# Patient Record
Sex: FEMALE | Race: WHITE
[De-identification: names, ages, dates, MRNs, and addresses within clinical notes are randomized per-mention and may not be internally consistent; named-entity substitution may affect disease eponyms.]

---

## 2017-02-12 ENCOUNTER — HOSPITAL ENCOUNTER (OUTPATIENT)
Dept: HOSPITAL 62 - LC | Age: 37
Discharge: HOME | End: 2017-02-12
Attending: OBSTETRICS & GYNECOLOGY
Payer: COMMERCIAL

## 2017-02-12 DIAGNOSIS — O47.02: Primary | ICD-10-CM

## 2017-02-12 DIAGNOSIS — O09.522: ICD-10-CM

## 2017-02-12 DIAGNOSIS — Z3A.17: ICD-10-CM

## 2017-02-12 LAB
APPEARANCE UR: CLEAR
BARBITURATES UR QL SCN: (no result)
BILIRUB UR QL STRIP: NEGATIVE
GLUCOSE UR STRIP-MCNC: NEGATIVE MG/DL
KETONES UR STRIP-MCNC: NEGATIVE MG/DL
METHADONE UR QL SCN: NEGATIVE
NITRITE UR QL STRIP: NEGATIVE
PCP UR QL SCN: NEGATIVE
PH UR STRIP: 6 [PH] (ref 5–9)
PROT UR STRIP-MCNC: NEGATIVE MG/DL
SP GR UR STRIP: 1
URINE OPIATES LOW: NEGATIVE
UROBILINOGEN UR-MCNC: NEGATIVE MG/DL (ref ?–2)

## 2017-02-12 PROCEDURE — 76815 OB US LIMITED FETUS(S): CPT

## 2017-02-12 PROCEDURE — 4A1HXCZ MONITORING OF PRODUCTS OF CONCEPTION, CARDIAC RATE, EXTERNAL APPROACH: ICD-10-PCS | Performed by: OBSTETRICS & GYNECOLOGY

## 2017-02-12 PROCEDURE — 80307 DRUG TEST PRSMV CHEM ANLYZR: CPT

## 2017-02-12 PROCEDURE — 81001 URINALYSIS AUTO W/SCOPE: CPT

## 2017-06-09 ENCOUNTER — HOSPITAL ENCOUNTER (OUTPATIENT)
Dept: HOSPITAL 62 - LC | Age: 37
Discharge: HOME | End: 2017-06-09
Attending: OBSTETRICS & GYNECOLOGY
Payer: COMMERCIAL

## 2017-06-09 DIAGNOSIS — O16.3: Primary | ICD-10-CM

## 2017-06-09 DIAGNOSIS — O09.523: ICD-10-CM

## 2017-06-09 DIAGNOSIS — Z3A.34: ICD-10-CM

## 2017-06-09 LAB
ALBUMIN SERPL-MCNC: 3.1 G/DL (ref 3.5–5)
ALP SERPL-CCNC: 94 U/L (ref 38–126)
ALT SERPL-CCNC: 23 U/L (ref 9–52)
ANION GAP SERPL CALC-SCNC: 8 MMOL/L (ref 5–19)
APPEARANCE UR: CLEAR
AST SERPL-CCNC: 19 U/L (ref 14–36)
BARBITURATES UR QL SCN: (no result)
BASOPHILS # BLD AUTO: 0.1 10^3/UL (ref 0–0.2)
BASOPHILS NFR BLD AUTO: 0.6 % (ref 0–2)
BILIRUB DIRECT SERPL-MCNC: 0.2 MG/DL (ref 0–0.4)
BILIRUB SERPL-MCNC: 0.3 MG/DL (ref 0.2–1.3)
BILIRUB UR QL STRIP: NEGATIVE
BUN SERPL-MCNC: 6 MG/DL (ref 7–20)
CALCIUM: 8.6 MG/DL (ref 8.4–10.2)
CHLORIDE SERPL-SCNC: 108 MMOL/L (ref 98–107)
CO2 SERPL-SCNC: 22 MMOL/L (ref 22–30)
CREAT SERPL-MCNC: 0.57 MG/DL (ref 0.52–1.25)
CREAT UR-MCNC: 17 MG/DL (ref 16–327)
EOSINOPHIL # BLD AUTO: 0.1 10^3/UL (ref 0–0.6)
EOSINOPHIL NFR BLD AUTO: 1.5 % (ref 0–6)
ERYTHROCYTE [DISTWIDTH] IN BLOOD BY AUTOMATED COUNT: 12.9 % (ref 11.5–14)
GLUCOSE SERPL-MCNC: 77 MG/DL (ref 75–110)
GLUCOSE UR STRIP-MCNC: NEGATIVE MG/DL
HCT VFR BLD CALC: 32.2 % (ref 36–47)
HGB BLD-MCNC: 11 G/DL (ref 12–15.5)
HGB HCT DIFFERENCE: 0.8
KETONES UR STRIP-MCNC: NEGATIVE MG/DL
LDH1 SERPL-CCNC: 413 U/L (ref 313–618)
LYMPHOCYTES # BLD AUTO: 1.7 10^3/UL (ref 0.5–4.7)
LYMPHOCYTES NFR BLD AUTO: 19.4 % (ref 13–45)
MCH RBC QN AUTO: 29.6 PG (ref 27–33.4)
MCHC RBC AUTO-ENTMCNC: 34.1 G/DL (ref 32–36)
MCV RBC AUTO: 87 FL (ref 80–97)
METHADONE UR QL SCN: NEGATIVE
MONOCYTES # BLD AUTO: 0.8 10^3/UL (ref 0.1–1.4)
MONOCYTES NFR BLD AUTO: 9.4 % (ref 3–13)
NEUTROPHILS # BLD AUTO: 6 10^3/UL (ref 1.7–8.2)
NEUTS SEG NFR BLD AUTO: 69.1 % (ref 42–78)
NITRITE UR QL STRIP: NEGATIVE
PCP UR QL SCN: NEGATIVE
PH UR STRIP: 7 [PH] (ref 5–9)
POTASSIUM SERPL-SCNC: 3.6 MMOL/L (ref 3.6–5)
PROT SERPL-MCNC: 6 G/DL (ref 6.3–8.2)
PROT UR STRIP-MCNC: 77.9 MG/DL (ref ?–12)
PROT UR STRIP-MCNC: NEGATIVE MG/DL
RBC # BLD AUTO: 3.71 10^6/UL (ref 3.72–5.28)
RBC #/AREA URNS HPF: (no result) /HPF
SODIUM SERPL-SCNC: 137.8 MMOL/L (ref 137–145)
SP GR UR STRIP: 1
URATE SERPL-MCNC: 3.2 MG/DL (ref 2.5–7)
URINE OPIATES LOW: NEGATIVE
UROBILINOGEN UR-MCNC: NEGATIVE MG/DL (ref ?–2)
WBC # BLD AUTO: 8.7 10^3/UL (ref 4–10.5)
WBC #/AREA URNS HPF: (no result) /HPF

## 2017-06-09 PROCEDURE — 4A1HXCZ MONITORING OF PRODUCTS OF CONCEPTION, CARDIAC RATE, EXTERNAL APPROACH: ICD-10-PCS | Performed by: OBSTETRICS & GYNECOLOGY

## 2017-06-09 PROCEDURE — 59025 FETAL NON-STRESS TEST: CPT

## 2017-06-09 PROCEDURE — 84156 ASSAY OF PROTEIN URINE: CPT

## 2017-06-09 PROCEDURE — 85025 COMPLETE CBC W/AUTO DIFF WBC: CPT

## 2017-06-09 PROCEDURE — 36415 COLL VENOUS BLD VENIPUNCTURE: CPT

## 2017-06-09 PROCEDURE — 84550 ASSAY OF BLOOD/URIC ACID: CPT

## 2017-06-09 PROCEDURE — 80307 DRUG TEST PRSMV CHEM ANLYZR: CPT

## 2017-06-09 PROCEDURE — 81001 URINALYSIS AUTO W/SCOPE: CPT

## 2017-06-09 PROCEDURE — 83615 LACTATE (LD) (LDH) ENZYME: CPT

## 2017-06-09 PROCEDURE — 80053 COMPREHEN METABOLIC PANEL: CPT

## 2017-06-09 PROCEDURE — 82570 ASSAY OF URINE CREATININE: CPT

## 2017-06-09 NOTE — NON STRESS TEST REPORT
=================================================================

Non Stress Test

=================================================================

Datetime Report Generated by CPN: 06/09/2017 11:50

   

   

=================================================================

DEMOGRAPHIC

=================================================================

   

EGA NST:  34.2

   

=================================================================

INDICATION

=================================================================

   

Indication for Study:  Ordered by Provider

Indication for Study (NST) Other:  lc

   

=================================================================

MONITORING

=================================================================

   

Monitor Explained:  Monitor Explained; Test Explained; Patient

   Verbalized Understanding

Time on Monitor:  06/09/2017 09:40

Time off Monitor:  06/09/2017 11:37

NST Duration:  117

   

=================================================================

NST INTERVENTIONS

=================================================================

   

NST Interventions:  PO Hydration; Reposition Patient

Physician Notified NST:  BHARGAV GirardJAMESALISON

BABY A:  [id number]

   

=================================================================

BABY A

=================================================================

   

Fetal Movement :  Present

Contraction Frequency :  none

FHR Baseline :  130

Accelerations :  15X15

Decelerations :  None

Variability :  Moderate 6-25bpm

NST Review:  Meets Criteria for Reactive NST

NST Review and Verified By :  KAREN Edgar Results:  Reactive

NST Results:  Reactive

   

=================================================================

NST REPORT

=================================================================

   

Report Trigger:  Send Report

## 2017-06-11 LAB — PROT UR STRIP-MCNC: 19.9 MG/DL (ref ?–12)

## 2017-06-29 ENCOUNTER — HOSPITAL ENCOUNTER (EMERGENCY)
Dept: HOSPITAL 62 - ER | Age: 37
Discharge: HOME | End: 2017-06-29
Payer: COMMERCIAL

## 2017-06-29 VITALS — SYSTOLIC BLOOD PRESSURE: 118 MMHG | DIASTOLIC BLOOD PRESSURE: 78 MMHG

## 2017-06-29 DIAGNOSIS — O26.893: Primary | ICD-10-CM

## 2017-06-29 DIAGNOSIS — R10.9: ICD-10-CM

## 2017-06-29 DIAGNOSIS — Z3A.37: ICD-10-CM

## 2017-06-29 DIAGNOSIS — Z90.49: ICD-10-CM

## 2017-06-29 LAB
ALBUMIN SERPL-MCNC: 3.6 G/DL (ref 3.5–5)
ALP SERPL-CCNC: 126 U/L (ref 38–126)
ALT SERPL-CCNC: 29 U/L (ref 9–52)
ANION GAP SERPL CALC-SCNC: 9 MMOL/L (ref 5–19)
APPEARANCE UR: CLEAR
AST SERPL-CCNC: 24 U/L (ref 14–36)
BASOPHILS # BLD AUTO: 0 10^3/UL (ref 0–0.2)
BASOPHILS NFR BLD AUTO: 0.4 % (ref 0–2)
BILIRUB DIRECT SERPL-MCNC: 0.2 MG/DL (ref 0–0.4)
BILIRUB SERPL-MCNC: 0.6 MG/DL (ref 0.2–1.3)
BILIRUB UR QL STRIP: NEGATIVE
BUN SERPL-MCNC: 9 MG/DL (ref 7–20)
CALCIUM: 8.8 MG/DL (ref 8.4–10.2)
CHLORIDE SERPL-SCNC: 106 MMOL/L (ref 98–107)
CO2 SERPL-SCNC: 20 MMOL/L (ref 22–30)
CREAT SERPL-MCNC: 0.63 MG/DL (ref 0.52–1.25)
EOSINOPHIL # BLD AUTO: 0.1 10^3/UL (ref 0–0.6)
EOSINOPHIL NFR BLD AUTO: 0.8 % (ref 0–6)
ERYTHROCYTE [DISTWIDTH] IN BLOOD BY AUTOMATED COUNT: 12.8 % (ref 11.5–14)
GLUCOSE SERPL-MCNC: 76 MG/DL (ref 75–110)
GLUCOSE UR STRIP-MCNC: NEGATIVE MG/DL
HCT VFR BLD CALC: 33.7 % (ref 36–47)
HGB BLD-MCNC: 11.6 G/DL (ref 12–15.5)
HGB HCT DIFFERENCE: 1.1
KETONES UR STRIP-MCNC: NEGATIVE MG/DL
LIPASE SERPL-CCNC: 79.7 U/L (ref 23–300)
LYMPHOCYTES # BLD AUTO: 1.7 10^3/UL (ref 0.5–4.7)
LYMPHOCYTES NFR BLD AUTO: 16.6 % (ref 13–45)
MCH RBC QN AUTO: 29.2 PG (ref 27–33.4)
MCHC RBC AUTO-ENTMCNC: 34.3 G/DL (ref 32–36)
MCV RBC AUTO: 85 FL (ref 80–97)
MONOCYTES # BLD AUTO: 0.9 10^3/UL (ref 0.1–1.4)
MONOCYTES NFR BLD AUTO: 8.6 % (ref 3–13)
NEUTROPHILS # BLD AUTO: 7.7 10^3/UL (ref 1.7–8.2)
NEUTS SEG NFR BLD AUTO: 73.6 % (ref 42–78)
NITRITE UR QL STRIP: NEGATIVE
PH UR STRIP: 7 [PH] (ref 5–9)
POTASSIUM SERPL-SCNC: 4 MMOL/L (ref 3.6–5)
PROT SERPL-MCNC: 6.7 G/DL (ref 6.3–8.2)
PROT UR STRIP-MCNC: NEGATIVE MG/DL
RBC # BLD AUTO: 3.96 10^6/UL (ref 3.72–5.28)
SODIUM SERPL-SCNC: 135.2 MMOL/L (ref 137–145)
SP GR UR STRIP: 1
UROBILINOGEN UR-MCNC: NEGATIVE MG/DL (ref ?–2)
WBC # BLD AUTO: 10.5 10^3/UL (ref 4–10.5)

## 2017-06-29 PROCEDURE — 85025 COMPLETE CBC W/AUTO DIFF WBC: CPT

## 2017-06-29 PROCEDURE — 83690 ASSAY OF LIPASE: CPT

## 2017-06-29 PROCEDURE — 81001 URINALYSIS AUTO W/SCOPE: CPT

## 2017-06-29 PROCEDURE — 76775 US EXAM ABDO BACK WALL LIM: CPT

## 2017-06-29 PROCEDURE — 80053 COMPREHEN METABOLIC PANEL: CPT

## 2017-06-29 PROCEDURE — 36415 COLL VENOUS BLD VENIPUNCTURE: CPT

## 2017-06-29 PROCEDURE — 99284 EMERGENCY DEPT VISIT MOD MDM: CPT

## 2017-06-29 NOTE — ER DOCUMENT REPORT
ED GI/





- General


Mode of Arrival: Ambulatory


Information source: Patient


TRAVEL OUTSIDE OF THE [place] IN LAST 30 DAYS: No





- HPI


Patient complains to provider of: Other - left flank pain


Onset: This afternoon - see notes above


Location: Left flank


Fetal heart tones (bpm): 141


Associated symptoms: Other - see notes above





<DEEP SPICER - Last Filed: 06/29/17 16:49>





<RAYNA ENAMORADO - Last Filed: 06/29/17 21:24>





- General


Chief Complaint: Flank Pain


Stated Complaint: BACK PAIN


Time Seen by Provider: 06/29/17 14:18


Notes: 


37 year old pregnant female (37 weeks) presents to the ED complaining of left 

flank pain that started suddenly this morning while walking around at work. 

Patient reports that the pain started out dull and became intense, but subsided 

shortly after. While walking to her car, the patient suddenly developed worse 

pain than earlier to the point that she was having difficulty walking, but 

again subsided. Patient reports that she had another episode of pain before 

coming to the ED. Patient reports that she has a history of right flank pain 

and kidney problems. Patient had an appointment with her OB this morning and 

was told that she had protein in her urine. Patient reports having 2 

contractions an hour, but isn't concerned about being seen by L&D.


 (DEEP SPICER)





- Related Data


Allergies/Adverse Reactions: 


 





No Known Allergies Allergy (Verified 06/29/17 14:10)


 











Past Medical History





- General


Information source: Patient





- Social History


Smoking Status: Never Smoker


Chew tobacco use (# tins/day): No


Frequency of alcohol use: None


Drug Abuse: None


Family History: Reviewed & Not Pertinent


Patient has suicidal ideation: No


Patient has homicidal ideation: No


Renal/ Medical History: Reports: Other - History of right flank pain and 

kidney issues..  Denies: Hx Peritoneal Dialysis


Past Surgical History: Reports: Hx Cholecystectomy





<DEEP SPICER - Last Filed: 06/29/17 16:49>





Review of Systems





- Review of Systems


Constitutional: No symptoms reported


EENT: No symptoms reported


Cardiovascular: No symptoms reported


Respiratory: No symptoms reported


Gastrointestinal: No symptoms reported


Genitourinary: See HPI, Flank pain - left


Female Genitourinary: See HPI, Pregnant - 37 weeks


Musculoskeletal: No symptoms reported


Skin: No symptoms reported


Hematologic/Lymphatic: No symptoms reported


Neurological/Psychological: No symptoms reported


-: Yes All other systems reviewed and negative





<DEEP SPICER - Last Filed: 06/29/17 16:49>





Physical Exam





- General


General appearance: Appears well, Alert


In distress: None





- HEENT


Head: Normocephalic, Atraumatic


Eyes: Normal


Extraocular movements intact: Yes


Pupils: PERRL





- Respiratory


Respiratory status: No respiratory distress


Breath sounds: Normal





- Cardiovascular


Rhythm: Regular


Heart sounds: Normal auscultation





- Abdominal


Inspection: Normal, Gravid female


Distension: No distension


Tenderness: Nontender





- Back


Back: Normal, Nontender





- Extremities


General upper extremity: Normal inspection, Normal ROM


General lower extremity: Normal inspection, Normal ROM





- Neurological


Neuro grossly intact: Yes


Cognition: Normal


Orientation: AAOx4


[place] Coma Scale Eye Opening: Spontaneous


[place] Coma Scale Verbal: Oriented


[place] Coma Scale Motor: Obeys Commands


[place] Coma Scale Total: 15


Speech: Normal





- Psychological


Associated symptoms: Normal affect, Normal mood





- Skin


Skin Temperature: Warm


Skin Moisture: Dry


Skin Color: Normal





<DEEP SPICER - Last Filed: 06/29/17 16:49>





Course





- Laboratory


Result Diagrams: 


 06/29/17 14:30





 06/29/17 14:30





<DANNIELLEDEEP - Last Filed: 06/29/17 16:49>





- Laboratory


Result Diagrams: 


 06/29/17 14:30





 06/29/17 14:30





- Diagnostic Test


Radiology reviewed: Reports reviewed





<RAYNA ENAMORADO - Last Filed: 06/29/17 21:24>





- Re-evaluation


Re-evalutation: 





06/29/17 


Patient with no pain at this time.  Encouraged to drink fluids.  Fetal heart 

tones were good.  Patient recommended to go to labor and delivery, but states 

that she feels well and does not want to go at this time.  She will be 

discharged home and is to follow-up with her OB/GYN.  Stable for discharge.  

Return if any worsening or concerning symptoms.  Understands and agrees with 

plan. (RAYNA ENAMORADO)





- Vital Signs


Vital signs: 


 











Temp Pulse Resp BP Pulse Ox


 


 97.9 F   78   18   118/78   97 


 


 06/29/17 15:54  06/29/17 15:54  06/29/17 15:54  06/29/17 15:54  06/29/17 15:54














- Laboratory


Laboratory results interpreted by me: 


 











  06/29/17 06/29/17





  14:30 14:30


 


Hgb  11.6 L 


 


Hct  33.7 L 


 


Sodium   135.2 L


 


Carbon Dioxide   20 L














Discharge





<DEEP SPICER - Last Filed: 06/29/17 16:49>





<RAYNA ENAMORADO - Last Filed: 06/29/17 21:24>





- Discharge


Clinical Impression: 


 Flank pain





Pregnancy


Qualifiers:


 Weeks of gestation: 36 weeks Qualified Code(s): Z3A.36 - 36 weeks gestation of 

pregnancy





Condition: Stable


Disposition: HOME, SELF-CARE


Instructions:  Flank Pain ([facility])


Additional Instructions: 


Please follow-up with your OB/GYN provider this week.


Referrals: 


TONIO SAGE MD [Primary Care Provider] - Follow up as needed


Scribe Attestation: 





06/29/17 21:23


I personally performed the services described in the documentation, reviewed 

and edited the documentation which was dictated to the scribe in my presence, 

and it accurately records my words and actions. (RAYNA ENAMORADO)





Scribe Documentation





- Scribe


Written by Scribe:: Cameron Quintana, 06/29/2017 1628


acting as scribe for :: Shivani





<DEEP SPICER - Last Filed: 06/29/17 16:49>

## 2017-06-29 NOTE — RADIOLOGY REPORT (SQ)
EXAM DESCRIPTION:  U/S RETROPERITON LTD



COMPLETED DATE/TIME:  6/29/2017 3:11 pm



REASON FOR STUDY:  left flank eval stone



COMPARISON:  None.



TECHNIQUE:  Dynamic and static grayscale images acquired of the kidneys and bladder and recorded on P
ACS. Additional selected color Doppler and spectral images recorded.



LIMITATIONS:  None.



FINDINGS:  RIGHT KIDNEY:  Normal size.   Normal echogenicity.   No solid or suspicious masses.   Mild
 dilatation of the calices/collecting system on the right which is within normal limits for gestation
al age.   No calcifications.

LEFT KIDNEY:  Normal size.   Normal echogenicity.   No solid or suspicious masses.   Mild caliectasis
 of a superior calyx..   No calcifications.

BLADDER: No masses.

OTHER FINDINGS: No other significant finding.



IMPRESSION:  Mild right-sided hydronephrosis which is within normal limits for gestational age.  Mild
 caliectasis of the superior pole calyx on the left.  No renal calculi identified.  Otherwise unremar
kable renal ultrasound



TECHNICAL DOCUMENTATION:  JOB ID:  [id number]

 2011 [employer]- All Rights Reserved

## 2017-06-29 NOTE — ER DOCUMENT REPORT
ED Medical Screen (RME)





- General


Chief Complaint: Flank Pain


Stated Complaint: BACK PAIN


Time Seen by Provider: 06/29/17 14:18


TRAVEL OUTSIDE OF THE [place] IN LAST 30 DAYS: No





- HPI


Notes: 





06/29/17 14:20


Patient is 38 weeks pregnant coming in the left flank pain states does not feel 

like her labor pains in the past states this feels like her kidney stones 

however this is more intermittent.





- Related Data


Allergies/Adverse Reactions: 


 





No Known Allergies Allergy (Verified 06/29/17 14:10)


 











Past Medical History


Renal/ Medical History: Denies: Hx Peritoneal Dialysis


Past Surgical History: Reports: Hx Cholecystectomy





Review of Systems





- Review of Systems


Genitourinary: Flank pain





Physical Exam





- Vital signs


Vitals: 





 











Temp Pulse Resp BP Pulse Ox


 


 98.1 F   88   20   120/80   98 


 


 06/29/17 14:10  06/29/17 14:10  06/29/17 14:10  06/29/17 14:10  06/29/17 14:10














- Respiratory


Respiratory status: No respiratory distress


Chest status: Nontender


Breath sounds: Normal


Chest palpation: Normal





- Abdominal


Inspection: Gravid female





Course





- Vital Signs


Vital signs: 





 











Temp Pulse Resp BP Pulse Ox


 


 98.1 F   88   20   120/80   98 


 


 06/29/17 14:10  06/29/17 14:10  06/29/17 14:10  06/29/17 14:10  06/29/17 14:10

## 2017-07-12 ENCOUNTER — HOSPITAL ENCOUNTER (INPATIENT)
Dept: HOSPITAL 62 - LR | Age: 37
LOS: 2 days | Discharge: HOME | End: 2017-07-14
Attending: OBSTETRICS & GYNECOLOGY | Admitting: OBSTETRICS & GYNECOLOGY
Payer: COMMERCIAL

## 2017-07-12 DIAGNOSIS — A60.00: ICD-10-CM

## 2017-07-12 DIAGNOSIS — Z23: ICD-10-CM

## 2017-07-12 DIAGNOSIS — F43.10: ICD-10-CM

## 2017-07-12 DIAGNOSIS — Z87.891: ICD-10-CM

## 2017-07-12 DIAGNOSIS — Z79.899: ICD-10-CM

## 2017-07-12 DIAGNOSIS — Z3A.39: ICD-10-CM

## 2017-07-12 LAB
APPEARANCE UR: CLEAR
BARBITURATES UR QL SCN: NEGATIVE
BASOPHILS # BLD AUTO: 0 10^3/UL (ref 0–0.2)
BASOPHILS NFR BLD AUTO: 0.4 % (ref 0–2)
BILIRUB UR QL STRIP: NEGATIVE
EOSINOPHIL # BLD AUTO: 0.2 10^3/UL (ref 0–0.6)
EOSINOPHIL NFR BLD AUTO: 1.4 % (ref 0–6)
ERYTHROCYTE [DISTWIDTH] IN BLOOD BY AUTOMATED COUNT: 12.9 % (ref 11.5–14)
GLUCOSE UR STRIP-MCNC: NEGATIVE MG/DL
HCT VFR BLD CALC: 32.6 % (ref 36–47)
HGB BLD-MCNC: 10.8 G/DL (ref 12–15.5)
HGB HCT DIFFERENCE: -0.2
KETONES UR STRIP-MCNC: NEGATIVE MG/DL
LYMPHOCYTES # BLD AUTO: 1.7 10^3/UL (ref 0.5–4.7)
LYMPHOCYTES NFR BLD AUTO: 15.7 % (ref 13–45)
MCH RBC QN AUTO: 28.4 PG (ref 27–33.4)
MCHC RBC AUTO-ENTMCNC: 33.1 G/DL (ref 32–36)
MCV RBC AUTO: 86 FL (ref 80–97)
METHADONE UR QL SCN: NEGATIVE
MONOCYTES # BLD AUTO: 1 10^3/UL (ref 0.1–1.4)
MONOCYTES NFR BLD AUTO: 9.2 % (ref 3–13)
NEUTROPHILS # BLD AUTO: 8.2 10^3/UL (ref 1.7–8.2)
NEUTS SEG NFR BLD AUTO: 73.3 % (ref 42–78)
NITRITE UR QL STRIP: NEGATIVE
PCP UR QL SCN: NEGATIVE
PH UR STRIP: 7 [PH] (ref 5–9)
PROT UR STRIP-MCNC: NEGATIVE MG/DL
RBC # BLD AUTO: 3.8 10^6/UL (ref 3.72–5.28)
SP GR UR STRIP: 1
URINE OPIATES LOW: NEGATIVE
UROBILINOGEN UR-MCNC: NEGATIVE MG/DL (ref ?–2)
WBC # BLD AUTO: 11.1 10^3/UL (ref 4–10.5)

## 2017-07-12 PROCEDURE — 86592 SYPHILIS TEST NON-TREP QUAL: CPT

## 2017-07-12 PROCEDURE — 86850 RBC ANTIBODY SCREEN: CPT

## 2017-07-12 PROCEDURE — 85027 COMPLETE CBC AUTOMATED: CPT

## 2017-07-12 PROCEDURE — 36415 COLL VENOUS BLD VENIPUNCTURE: CPT

## 2017-07-12 PROCEDURE — 86900 BLOOD TYPING SEROLOGIC ABO: CPT

## 2017-07-12 PROCEDURE — 94760 N-INVAS EAR/PLS OXIMETRY 1: CPT

## 2017-07-12 PROCEDURE — 85025 COMPLETE CBC W/AUTO DIFF WBC: CPT

## 2017-07-12 PROCEDURE — 4A1HXCZ MONITORING OF PRODUCTS OF CONCEPTION, CARDIAC RATE, EXTERNAL APPROACH: ICD-10-PCS | Performed by: MIDWIFE

## 2017-07-12 PROCEDURE — 86901 BLOOD TYPING SEROLOGIC RH(D): CPT

## 2017-07-12 PROCEDURE — 80307 DRUG TEST PRSMV CHEM ANLYZR: CPT

## 2017-07-12 PROCEDURE — 81005 URINALYSIS: CPT

## 2017-07-12 PROCEDURE — 90707 MMR VACCINE SC: CPT

## 2017-07-12 RX ADMIN — IBUPROFEN SCH MG: 800 TABLET, FILM COATED ORAL at 21:06

## 2017-07-12 RX ADMIN — FERROUS SULFATE TAB 325 MG (65 MG ELEMENTAL FE) SCH MG: 325 (65 FE) TAB at 21:07

## 2017-07-12 RX ADMIN — DOCUSATE SODIUM SCH MG: 100 CAPSULE, LIQUID FILLED ORAL at 21:08

## 2017-07-12 NOTE — L&D PROGRESS NOTES
=================================================================

PROGRESS NOTES

=================================================================

Datetime Report Generated by CPN: 07/12/2017 14:11

   

   

=================================================================

PROGRESS NOTE

=================================================================

   

Impression:  Normal Progression of Labor; Reassuring Fetal Heart Rate

Procedures:  Fetal Scalp Electrode

Plan:  Continue Present Management; Induction

Informed Consent Obtained:  Induction of Labor

Vital Signs :  Reviewed

Comment:  continue present mgmt

   

=================================================================

VAGINAL EXAM

=================================================================

   

Dilatation:  8

Effacement:  90

Station:  -1

Contractions:  2-5

   

=================================================================

FETUS A

=================================================================

   

FHR - Baseline:  125

Monitoring:  External US

Variability:  Moderate 6-25bpm

Accelerations:  15X15

Decelerations:  Early

   

=================================================================

FETUS C

=================================================================

   

SIGNATURE:  [id number]

Assignment:  Poppy Harper MD

Signature:  Electronically signed by Aliya Phelan CNM on 7/12/2017 at

   14:11  with User ID: [email or web address]

:  Electronically signed by Aliya Phelan CNM on 7/12/2017 at 14:11 

   with User ID: [email or web address]

## 2017-07-12 NOTE — L&D PROGRESS NOTES
=================================================================

PROGRESS NOTES

=================================================================

Datetime Report Generated by CPN: 07/12/2017 12:28

   

   

=================================================================

PROGRESS NOTE

=================================================================

   

Impression:  Normal Progression of Labor

Procedures:  Artificial ROM

Procedures- Other:  clear

Plan:  Continue Present Management

Plan Other:  may have epdiural

Informed Consent Obtained:  Vaginal Delivery

Vital Signs :  Reviewed

Comment:  AROM, clear

   PT may have epidural

   

=================================================================

VAGINAL EXAM

=================================================================

   

Dilatation:  4

Dilatation:  3

Effacement:  50

Effacement:  50

Station:  -1

Station:  -3

Contractions:  irregular

Contractions:  irregular

   

=================================================================

MEMBRANES

=================================================================

   

Membranes:  Ruptured

Membranes:  Intact

Amniotic Fluid Color:  Clear

   

=================================================================

FETUS A

=================================================================

   

FHR - Baseline:  125

Monitoring:  External US

Variability:  Moderate 6-25bpm

Accelerations:  15X15

Decelerations:  None

FHR Category:  Category I

Estimated Fetal Weight (gm):  3800

Fetal Presentation:  Vertex

   

=================================================================

SIGNATURE

=================================================================

   

SIGNATURE:  [id number]

SIGNATURE:  [id number]

Assignment:  Poppy Harper MD

Signature:  Electronically signed by Aliya Phelan CNM on 7/12/2017 at

   12:27  with User ID: [email or web address]

:  Electronically signed by Aliya Phelan CNM on 7/12/2017 at 12:27 

   with User ID: [email or web address]
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
## 2017-07-13 LAB
ERYTHROCYTE [DISTWIDTH] IN BLOOD BY AUTOMATED COUNT: 13.4 % (ref 11.5–14)
HCT VFR BLD CALC: 32.7 % (ref 36–47)
HGB BLD-MCNC: 10.9 G/DL (ref 12–15.5)
HGB HCT DIFFERENCE: 0
MCH RBC QN AUTO: 28.4 PG (ref 27–33.4)
MCHC RBC AUTO-ENTMCNC: 33.3 G/DL (ref 32–36)
MCV RBC AUTO: 85 FL (ref 80–97)
RBC # BLD AUTO: 3.83 10^6/UL (ref 3.72–5.28)
WBC # BLD AUTO: 12.8 10^3/UL (ref 4–10.5)

## 2017-07-13 RX ADMIN — DOCUSATE SODIUM SCH MG: 100 CAPSULE, LIQUID FILLED ORAL at 18:26

## 2017-07-13 RX ADMIN — IBUPROFEN SCH MG: 800 TABLET, FILM COATED ORAL at 13:14

## 2017-07-13 RX ADMIN — PRENATAL W/O A VIT W/ FE FUMARATE-FA CAP 106.5-1 MG SCH CAP: 106.5 CAPSULE ORAL at 11:18

## 2017-07-13 RX ADMIN — IBUPROFEN SCH MG: 800 TABLET, FILM COATED ORAL at 05:12

## 2017-07-13 RX ADMIN — FERROUS SULFATE TAB 325 MG (65 MG ELEMENTAL FE) SCH MG: 325 (65 FE) TAB at 11:14

## 2017-07-13 RX ADMIN — SENNOSIDES, DOCUSATE SODIUM SCH EACH: 50; 8.6 TABLET, FILM COATED ORAL at 11:14

## 2017-07-13 RX ADMIN — FERROUS SULFATE TAB 325 MG (65 MG ELEMENTAL FE) SCH MG: 325 (65 FE) TAB at 18:26

## 2017-07-13 RX ADMIN — IBUPROFEN SCH MG: 800 TABLET, FILM COATED ORAL at 21:08

## 2017-07-13 RX ADMIN — DOCUSATE SODIUM SCH MG: 100 CAPSULE, LIQUID FILLED ORAL at 11:15

## 2017-07-13 NOTE — PDOC PROGRESS REPORT
Subjective-OB


Subjective: 


Post Delivery Day:








37 year old.  Denies any needs at this time 


pt bonding well with infant


ff@u-1 mild lochia


discharge planner for psychosocial concerns


pain managed with motrin and tylenol #3.


anticipate d/c in AM





Physical Exam (OB)


Vital Signs: 


 











Temp Pulse Resp BP Pulse Ox


 


 97.7 F   65   16   126/81 H  98 


 


 07/13/17 07:23  07/13/17 07:23  07/13/17 07:23  07/13/17 07:23  07/13/17 07:23








 Intake & Output











 07/12/17 07/13/17 07/14/17





 06:59 06:59 06:59


 


Weight 87.25 kg  














- PIH/Pre-Eclampsia


DTR's: 2 +


Clonus: Negative


Headache: Absent


Epigastric Pain: No


Visual Changes: No





- Lochia


Lochia Amount: Small 10-25 ml


Lochia Color: Rubra/Red





- Abdomen


Description: Soft, Round


Hernia Present: No


Fundal Description: Firm, Midline


Fundal Height: u/u - u/2





Objective-Diagnostic


Laboratory: 


 





 07/13/17 07:13 





 











  07/13/17





  07:13


 


WBC  12.8 H


 


RBC  3.83


 


Hgb  10.9 L


 


Hct  32.7 L


 


MCV  85


 


MCH  28.4


 


MCHC  33.3


 


RDW  13.4


 


Plt Count  197

## 2017-07-14 VITALS — SYSTOLIC BLOOD PRESSURE: 120 MMHG | DIASTOLIC BLOOD PRESSURE: 74 MMHG

## 2017-07-14 PROCEDURE — 3E0234Z INTRODUCTION OF SERUM, TOXOID AND VACCINE INTO MUSCLE, PERCUTANEOUS APPROACH: ICD-10-PCS | Performed by: MIDWIFE

## 2017-07-14 RX ADMIN — DOCUSATE SODIUM SCH MG: 100 CAPSULE, LIQUID FILLED ORAL at 12:09

## 2017-07-14 RX ADMIN — FERROUS SULFATE TAB 325 MG (65 MG ELEMENTAL FE) SCH MG: 325 (65 FE) TAB at 12:09

## 2017-07-14 RX ADMIN — SENNOSIDES, DOCUSATE SODIUM SCH EACH: 50; 8.6 TABLET, FILM COATED ORAL at 12:09

## 2017-07-14 RX ADMIN — PRENATAL W/O A VIT W/ FE FUMARATE-FA CAP 106.5-1 MG SCH CAP: 106.5 CAPSULE ORAL at 12:09

## 2017-07-14 RX ADMIN — IBUPROFEN SCH MG: 800 TABLET, FILM COATED ORAL at 05:37

## 2017-07-14 NOTE — DELIVERY SUMMARY
=================================================================

[facility] A-C

=================================================================

Datetime Report Generated by CPN: 2017 09:58

   

   

=================================================================

DELIVERY PERSONNEL

=================================================================

   

DELIVERY PERSONNEL:  [id number];[id number]

DELIVERY PERSONNEL:  [id number];[id number]

DELIVERY PERSONNEL:  [phone]

DELIVERY PERSONNEL:  [phone]

Delivery Doctor::  Aliya Phelan CNM

Nurse Midwife Certified::  Aliya Phelan CNM

Labor and Delivery Nurse::  Eugene Funes RN

Labor and Delivery Nurse::  IRENA Bernal

Scrub Tech/CNA:  Hayden Morgan, CST

   

=================================================================

MATERNAL INFORMATION

=================================================================

   

Delivery Anesthesia:  Epidural

Medications After Delivery:  Pitocin Bolus-Please Comment; Pitocin Drip

   20 Units/1000ml NSS

Estimated  Blood Loss (ml):  250

Provider Comments:  CNM called to room as infant head was delivering.

   CNM took over, head, shoulders, and body delivered without

   difficulty. Infant with spontaneous cry and respirations, to

   maternal abodmen, cord clamped X2 infant cut free by pts daughter. 

   Spontaneous delivery of placenta via zhang mechanism, appears

   intact, 3 VC. Superficial lacerations as above, not bleeding, not

   repaired. Hemostasis acheived with external fundal massage and IV

   pitocin. Mother and infant in stable condtion. Routine pp care. 

   

=================================================================

LABOR SUMMARY

=================================================================

   

EDC:  2017 00:00

No. Babies in Womb:  1

 Attempted:  No

Labor Anesthesia:  None

   

=================================================================

LABOR INFORMATION

=================================================================

   

Reason for Induction:  Other

Reason for Induction- Other:  proteinuria

Onset of Labor:  2017 11:46

Complete Dilatation:  2017 15:30

Oxytocin:  Induction

Group B Beta Strep:  Negative

Antibiotics # of Doses:  0

Steroids Given:  None

Reason Steroids Not Administered:  Not Applicable

   

=================================================================

MEMBRANES

=================================================================

   

Membranes Rupture Method:  Artificial

Rupture of Membranes:  2017 11:46

Length of Rupture (hr):  3.75

Amniotic Fluid Color:  Clear

Amniotic Fluid Color:  Clear

Amniotic Fluid Amount:  Moderate

Amniotic Fluid Amount:  Small

Amniotic Fluid Odor:  None

   

=================================================================

STAGES OF LABOR

=================================================================

   

Stage 1 hr:  3

Stage 1 min:  44

Stage 2 hr:  0

Stage 2 min:  1

Stage 3 hr:  0

Stage 3 min:  4

Total Time in Labor hr:  3

Total Time in Labor min:  49

   

=================================================================

VAGINAL DELIVERY

=================================================================

   

Episiotomy:  None

Laceration Extension:  N/A

Laceration Type:  None

Laceration Repair:  Not Applicable

Laceration Repair Note:  superficial vaginal, superficial labial

   laceration, not bleeding, not repaired

Sponge Count Correct:  N/A

Sharps Count Correct:  N/A

   

=================================================================

CSECTION DELIVERY

=================================================================

   

Primary Indication:  N/A

Secondary Indication:  N/A

CSection Incidence:  N/A

Labor:  N/A

Elective:  N/A

CSection Incision:  N/A

   

=================================================================

BABY A INFORMATION

=================================================================

   

Infant Delivery Date/Time:  2017 15:31

Method of Delivery:  Vaginal

Method of Delivery:  Vaginal

Born in Route :  No

:  N/A

Forceps:  N/A

Vacuum Extraction:  N/A

Shoulder Dystocia :  No

   

=================================================================

PRESENTATION/POSITION BABY A

=================================================================

   

Presentation:  Cephalic

Presentation:  Cephalic

Cephalic Presentation:  Vertex

Vertex Position:  Right Occipital Anterior

Breech Presentation:  N/A

   

=================================================================

PLACENTA INFORMATION BABY A

=================================================================

   

Placenta Delivery Time :  2017 15:35

Placenta Method of Delivery:  Spontaneous

Placenta Status:  Delivered

   

=================================================================

APGAR SCORES BABY A

=================================================================

   

Heart Rate 1 min:  >100 bpm

Resp Effort 1 min:  Good Cry

Reflex Irritability 1 min:  Cough or Sneeze or Pulls Away

Muscle Tone 1 min:  Active Motion

Color 1 min:  Body Pink, Extremities Blue

Resuscitation Effort 1 min:  Tactile Stimulation

APGAR SCORE 1 MIN:  9

Heart Rate 5 min:  >100 bpm

Resp Effort 5 min:  Good Cry

Reflex Irritability 5 min:  Cough or Sneeze or Pulls Away

Muscle Tone 5 min:  Active Motion

Color 5 min:  Body Pink, Extremities Blue

Resuscitation Effort 5 min:  N/A

APGAR SCORE 5 MIN:  9

Resuscitation Effort 10 min:  N/A

   

=================================================================

INFANT INFORMATION BABY A

=================================================================

   

Gestational Age at Delivery:  39.0

Gestational Status:  Full Term- 39- 40.6 Weeks

Infant Outcome :  Liveborn

Infant Condition :  Stable

Infant Sex:  Female

Infant Sex:  Female

   

=================================================================

IDENTIFICATION BABY A

=================================================================

   

Infant Verification Date/Time:  2017 15:50

ID Band Number:  [id number]

Mother's Name Verified:  Yes

Infant Medical Record Number:  [id number]

RN Verifying Infant:  Uyen Camp RNC

Additional Verifying Personnel:  D Brown RN

   

=================================================================

WEIGHT/LENGTH BABY A

=================================================================

   

Infant Birthweight (gm):  3410

Infant Weight (lb):  7

Infant Weight (oz):  8

Infant Length (in):  20.00

Infant Length (cm):  50.80

   

=================================================================

CORD INFORMATION BABY A

=================================================================

   

No. Cord Vessels:  3

Nuchal Cord :  N/A

Nuchal Cord- Other:  ankle cord

Cord Blood Taken:  Yes-For Eval (Mom's Blood Type - or O+)

Infant Suction:  None

   

=================================================================

ASSESSMENT BABY A

=================================================================

   

Infant Complications:  None

Physical Findings at Delivery:  Within Normal Limits

Infant Respirations:  Appears Normal

Skin to Skin:  Yes

Skin to Skin:  Yes

Skin to Skin Time (min):  60

Neonatologist/ALS Called :  No

Infant Care By:  KAROLINE Funes RN

Transferred To:  Remains with Mother

   

=================================================================

BABY B INFORMATION

=================================================================

   

 :  N/A

   

=================================================================

SIGNATURES

=================================================================

   

Assignment:  Poppy Harper MD

Signature:  Electronically signed by Aliya Phelan CNM on 2017 at

   15:58  with User ID: [email or web address]

:  Electronically signed by Aliya Phelan CNM on 2017 at 15:58 

   with User ID: [email or web address]

## 2017-07-14 NOTE — PDOC DISCHARGE SUMMARY
Final Diagnosis


Discharge Date: 17





Discharge Data





- Discharge Medication


Home Medications: 








Prenatal Vit No.124/Iron/FA [Prenatal Vitamin Tablet] 1 each PO DAILY 17 


Valacyclovir HCl [Valtrex] 1,000 mg PO DAILY 17 


Docusate Sodium [Colace 100 mg Capsule] 100 mg PO BID #60 capsule 17 


Ibuprofen [Motrin 800 mg Tablet] 800 mg PO Q8 #60 tablet 17 








Gestational Age: 39


Reason(s) for Admission: Induction of Labor, Other - ama


Prenatal Procedures: NST


Intrapartum Procedure(s): Spontaneous Vaginal Delivery





- [place] Data


  ** Baby 1 Female


Apgar at 1 minute: 9


Apgar at 5 minutes: 9


Weight: 3410 kg


Home with Mother: Yes


Complications: No





- Diagnosis Test


Laboratory: 


 











Temp Pulse Resp BP Pulse Ox


 


 97.9 F   63   16   120/74   98 


 


 17 07:33  17 07:33  17 07:33  17 07:33  17 07:33








 











  17





  06:18 06:37 07:13


 


RBC   3.80  3.83


 


Hgb   10.8 L  10.9 L


 


Hct   32.6 L  32.7 L


 


Urine Opiates Screen  NEGATIVE  














- Discharge information/Instructions


Discharge Activity: Activity As Tolerated, Pelvic Rest, No tub bath


Discharge Diet: Regular


Disposition: HOME, SELF-CARE


Follow up with: [facility]


in: 4, Weeks

## 2019-10-11 ENCOUNTER — HOSPITAL ENCOUNTER (OUTPATIENT)
Dept: HOSPITAL 62 - RAD | Age: 39
End: 2019-10-11
Attending: FAMILY MEDICINE
Payer: COMMERCIAL

## 2019-10-11 DIAGNOSIS — N20.0: ICD-10-CM

## 2019-10-11 DIAGNOSIS — N28.81: Primary | ICD-10-CM

## 2019-10-11 PROCEDURE — 74176 CT ABD & PELVIS W/O CONTRAST: CPT

## 2019-10-11 NOTE — RADIOLOGY REPORT (SQ)
EXAM DESCRIPTION:  CT ABD/PELVIS NO ORAL OR IV



COMPLETED DATE/TIME:  10/11/2019 9:24 am



REASON FOR STUDY:  (N28.81)HYPERTROPHY OF KIDNEY N28.81  HYPERTROPHY OF KIDNEY



COMPARISON:  Renal ultrasound dated 6/29/2017



TECHNIQUE:  CT scan of the abdomen and pelvis performed without intravenous or oral contrast. Images 
reviewed with lung, soft tissue, and bone windows. Reconstructed coronal and sagittal MPR images revi
ewed. All images stored on PACS.

All CT scanners at this facility use dose modulation, iterative reconstruction, and/or weight based d
osing when appropriate to reduce radiation dose to as low as reasonably achievable (ALARA).

[facility]: Dose Right  [facility]: CareDose    [facility]: Dose Right    [facility]: Teradose 4D    [facility]: Smart Attraction World



RADIATION DOSE:  CT Rad equipment meets quality standard of care and radiation dose reduction techniq
ues were employed. CTDIvol: 6.5 mGy. DLP: 334 mGy-cm.mGy.



LIMITATIONS:  None.



FINDINGS:  LOWER CHEST: No significant findings. No nodules or infiltrates.

NON-CONTRASTED LIVER, SPLEEN, ADRENALS: Evaluation limited by lack of IV contrast. No identified sign
ificant masses.

PANCREAS: No masses. No peripancreatic inflammatory changes.

GALLBLADDER: Surgically absent.

RIGHT KIDNEY AND URETER: No suspicious masses. Assessment limited by lack of IV contrast.   There is 
a 1 to 2 mm nonobstructing right renal stone.   There is prominence of the right renal pelvis.  No ur
eteral stones.

LEFT KIDNEY AND URETER: No suspicious masses. Assessment limited by lack of IV contrast.   No signifi
cant calcifications.   No hydronephrosis or hydroureter.

AORTA AND RETROPERITONEUM: No aneurysm. No retroperitoneal masses or adenopathy.

BOWEL AND PERITONEAL CAVITY: No obvious masses or inflammatory changes. No free fluid.

APPENDIX: Normal.

PELVIS, BLADDER, AND ABDOMINAL WALL:No abnormal masses. No free fluid. Bladder normal.

BONES: No significant findings.

OTHER: No other significant finding.



IMPRESSION:  Small nonobstructing left renal stone measuring 1 to 2 mm in greatest diameter.  Promine
nt extrarenal pelvis.  No hydronephrosis.



COMMENT:  Quality ID # [id number]: Final reports with documentation of one or more dose reduction techniques
 (e.g., Automated exposure control, adjustment of the mA and/or kV according to patient size, use of 
iterative reconstruction technique)



TECHNICAL DOCUMENTATION:  JOB ID:  [id number]

 2011 [employer]- All Rights Reserved



Reading location - IP/workstation name: [email or web address]

## 2020-02-04 ENCOUNTER — HOSPITAL ENCOUNTER (OUTPATIENT)
Dept: HOSPITAL 5 - SLR | Age: 40
Discharge: HOME | End: 2020-02-04
Payer: MEDICAID

## 2020-02-04 DIAGNOSIS — G47.33: Primary | ICD-10-CM

## 2020-02-04 PROCEDURE — G0399 HOME SLEEP TEST/TYPE 3 PORTA: HCPCS
